# Patient Record
Sex: FEMALE | Race: WHITE | NOT HISPANIC OR LATINO | URBAN - METROPOLITAN AREA
[De-identification: names, ages, dates, MRNs, and addresses within clinical notes are randomized per-mention and may not be internally consistent; named-entity substitution may affect disease eponyms.]

---

## 2022-10-18 ENCOUNTER — OFFICE (OUTPATIENT)
Dept: URBAN - METROPOLITAN AREA CLINIC 34 | Facility: CLINIC | Age: 37
End: 2022-10-18
Payer: COMMERCIAL

## 2022-10-18 VITALS
HEART RATE: 86 BPM | WEIGHT: 200 LBS | DIASTOLIC BLOOD PRESSURE: 90 MMHG | TEMPERATURE: 97.7 F | SYSTOLIC BLOOD PRESSURE: 122 MMHG | HEIGHT: 65 IN

## 2022-10-18 DIAGNOSIS — K50.90 CROHN'S DISEASE, UNSPECIFIED, WITHOUT COMPLICATIONS: ICD-10-CM

## 2022-10-18 DIAGNOSIS — M25.50 PAIN IN UNSPECIFIED JOINT: ICD-10-CM

## 2022-10-18 PROCEDURE — 99204 OFFICE O/P NEW MOD 45 MIN: CPT | Performed by: INTERNAL MEDICINE

## 2022-10-18 NOTE — SERVICEHPINOTES
JUAN ABDUL   is a   37   year old    female who is being seen in consultation at the request of   AFRICA MCGEE   for establishing care for Crohn's disease. She moved from [place]. She likely had symptoms starting in elementary school but was officially diagnosed in 2011. She thinks she has had involvement of both the small intestine and large intestine. She was managed on mesalamines for 10 yrs but that never completely controlled symptoms. She would have flares that would require prednisone about once a year. She then had bowel obstruction a couple of years ago for which she was admitted. She did not require surgery at the time but was started on biologic therapy after that. She was initially started on remicade but had side effects (rash and difficulty breathing). She was then transitioned to stelara which she is currently taking every 8 weeks. Her last colonoscopy was about a year ago but the scope could not be advanced beyond a certain point.  She did have imaging done after that.  She has been doing much better on stelara. She has noted less fatigue, more formed BMs, and no bleeding. She can get occasional cramping and episodes of abdominal pain. She can get bloating but less often and less severe since being on stelara.  She denies any skin rashes or oral ulcers or eye problems. She has noted some left sided joint pain (hip/knee/foot) which has gotten somewhat better since switching mattresses. She thinks the joint aches may get worse after stelara (at least after last injection) but on further questioning she thinks joint aches precede the stelara.   br Of note, she was told by a prior GI that she might be developing a gluten sensitivity based on pathology specimens.

## 2023-05-03 ENCOUNTER — OFFICE (OUTPATIENT)
Dept: URBAN - METROPOLITAN AREA CLINIC 79 | Facility: CLINIC | Age: 38
End: 2023-05-03

## 2023-05-03 ENCOUNTER — OFFICE (OUTPATIENT)
Dept: URBAN - METROPOLITAN AREA CLINIC 79 | Facility: CLINIC | Age: 38
End: 2023-05-03
Payer: COMMERCIAL

## 2023-05-03 VITALS
TEMPERATURE: 97.7 F | SYSTOLIC BLOOD PRESSURE: 129 MMHG | HEIGHT: 65 IN | HEART RATE: 83 BPM | DIASTOLIC BLOOD PRESSURE: 93 MMHG | WEIGHT: 200 LBS

## 2023-05-03 DIAGNOSIS — K50.90 CROHN'S DISEASE, UNSPECIFIED, WITHOUT COMPLICATIONS: ICD-10-CM

## 2023-05-03 DIAGNOSIS — R14.0 ABDOMINAL DISTENSION (GASEOUS): ICD-10-CM

## 2023-05-03 DIAGNOSIS — K21.00 GASTRO-ESOPHAGEAL REFLUX DISEASE WITH ESOPHAGITIS, WITHOUT B: ICD-10-CM

## 2023-05-03 DIAGNOSIS — K90.41 NON-CELIAC GLUTEN SENSITIVITY: ICD-10-CM

## 2023-05-03 DIAGNOSIS — M25.50 PAIN IN UNSPECIFIED JOINT: ICD-10-CM

## 2023-05-03 DIAGNOSIS — R21 RASH AND OTHER NONSPECIFIC SKIN ERUPTION: ICD-10-CM

## 2023-05-03 DIAGNOSIS — R10.9 UNSPECIFIED ABDOMINAL PAIN: ICD-10-CM

## 2023-05-03 PROCEDURE — 99215 OFFICE O/P EST HI 40 MIN: CPT | Performed by: NURSE PRACTITIONER

## 2023-05-03 PROCEDURE — 00031: CPT | Performed by: INTERNAL MEDICINE

## 2023-05-03 RX ORDER — FAMOTIDINE 20 MG/1
TABLET ORAL
Qty: 30 | Refills: 5 | Status: ACTIVE
Start: 2023-05-03

## 2023-05-03 NOTE — SERVICEHPINOTES
Brief history - She likely had symptoms starting in elementary school but was officially diagnosed in 2011. She thinks she has had involvement of both the small intestine and large intestine. She was managed on mesalamines for 10 yrs but that never completely controlled symptoms. She would have flares that would require prednisone about once a year. She then had bowel obstruction a couple of years ago for which she was admitted. She did not require surgery at the time but was started on biologic therapy after that. She was initially started on remicade but had side effects (rash and difficulty breathing). She was then transitioned to stelara which she is currently taking every 8 weeks. Her last colonoscopy was in 2021 ago but the scope could not be advanced beyond a certain point. She did have imaging done after that. She has been doing much better on stelara.
br
Miriane her last visit 10/2022, she had been doing fairly well until January when she had a lapse in her health insurance and she was briefly off her Stelara. She restarted Stelara in February and has had two doses 6 weeks apart with her last dose on April 26. During that lapse she completed 1 prednisone taper.brCurrently, her bowel movements are 5 to 6 times daily, BSS type 6 to 7. She has associated bloating where she feels like she’s 8 to 9 months pregnant. She has been tested for celiac‘s disease in the past which was negative however, she does feel that when she stops eating gluten her bloating symptoms improve. She has also cut lactose out of her diet. She also has GERD.  It is not controlled with Omeprazole.  Denies dysphagia or odynophagia. Recently, she was diagnosed with strep throat at an [facility] and is currently completing amoxicillin. Her diarrhea initially worsened, but she began taking probiotics which has helped. brShe denies and blood or mucous in her stools. She has associated LLQ pain.  No nausea or vomiting. +joint pains.  New rash in right AC area. Bentyl prescribed by PCP has helped with LLQ pain. erica maldonado

## 2023-06-21 ENCOUNTER — OFFICE (OUTPATIENT)
Dept: URBAN - METROPOLITAN AREA PATHOLOGY 18 | Facility: PATHOLOGY | Age: 38
End: 2023-06-21
Payer: COMMERCIAL

## 2023-06-21 ENCOUNTER — OFFICE (OUTPATIENT)
Dept: URBAN - METROPOLITAN AREA CLINIC 30 | Facility: CLINIC | Age: 38
End: 2023-06-21
Payer: COMMERCIAL

## 2023-06-21 VITALS
SYSTOLIC BLOOD PRESSURE: 127 MMHG | RESPIRATION RATE: 16 BRPM | DIASTOLIC BLOOD PRESSURE: 152 MMHG | OXYGEN SATURATION: 97 % | HEART RATE: 100 BPM | HEART RATE: 81 BPM | TEMPERATURE: 97.7 F | OXYGEN SATURATION: 98 % | DIASTOLIC BLOOD PRESSURE: 87 MMHG | OXYGEN SATURATION: 89 % | RESPIRATION RATE: 14 BRPM | HEART RATE: 79 BPM | DIASTOLIC BLOOD PRESSURE: 96 MMHG | SYSTOLIC BLOOD PRESSURE: 128 MMHG | SYSTOLIC BLOOD PRESSURE: 146 MMHG | HEART RATE: 95 BPM | SYSTOLIC BLOOD PRESSURE: 184 MMHG | HEART RATE: 101 BPM | SYSTOLIC BLOOD PRESSURE: 120 MMHG | HEIGHT: 65 IN | WEIGHT: 200 LBS | SYSTOLIC BLOOD PRESSURE: 157 MMHG | DIASTOLIC BLOOD PRESSURE: 99 MMHG | RESPIRATION RATE: 12 BRPM | DIASTOLIC BLOOD PRESSURE: 67 MMHG | DIASTOLIC BLOOD PRESSURE: 84 MMHG | HEART RATE: 102 BPM | SYSTOLIC BLOOD PRESSURE: 138 MMHG | OXYGEN SATURATION: 99 % | DIASTOLIC BLOOD PRESSURE: 95 MMHG | HEART RATE: 88 BPM | DIASTOLIC BLOOD PRESSURE: 82 MMHG | SYSTOLIC BLOOD PRESSURE: 148 MMHG | HEART RATE: 94 BPM | RESPIRATION RATE: 17 BRPM

## 2023-06-21 DIAGNOSIS — K56.699 OTHER INTESTINAL OBSTRUCTION UNSPECIFIED AS TO PARTIAL VERSU: ICD-10-CM

## 2023-06-21 DIAGNOSIS — R19.7 DIARRHEA, UNSPECIFIED: ICD-10-CM

## 2023-06-21 DIAGNOSIS — K21.00 GASTRO-ESOPHAGEAL REFLUX DISEASE WITH ESOPHAGITIS, WITHOUT B: ICD-10-CM

## 2023-06-21 DIAGNOSIS — K64.0 FIRST DEGREE HEMORRHOIDS: ICD-10-CM

## 2023-06-21 DIAGNOSIS — K90.41 NON-CELIAC GLUTEN SENSITIVITY: ICD-10-CM

## 2023-06-21 DIAGNOSIS — K50.90 CROHN'S DISEASE, UNSPECIFIED, WITHOUT COMPLICATIONS: ICD-10-CM

## 2023-06-21 DIAGNOSIS — K63.3 ULCER OF INTESTINE: ICD-10-CM

## 2023-06-21 DIAGNOSIS — R14.0 ABDOMINAL DISTENSION (GASEOUS): ICD-10-CM

## 2023-06-21 DIAGNOSIS — K29.50 UNSPECIFIED CHRONIC GASTRITIS WITHOUT BLEEDING: ICD-10-CM

## 2023-06-21 DIAGNOSIS — K63.89 OTHER SPECIFIED DISEASES OF INTESTINE: ICD-10-CM

## 2023-06-21 PROBLEM — K31.89 OTHER DISEASES OF STOMACH AND DUODENUM: Status: ACTIVE | Noted: 2023-06-21

## 2023-06-21 PROCEDURE — 88305 TISSUE EXAM BY PATHOLOGIST: CPT | Performed by: PATHOLOGY

## 2023-06-21 PROCEDURE — 88313 SPECIAL STAINS GROUP 2: CPT | Performed by: PATHOLOGY

## 2023-06-21 PROCEDURE — 88342 IMHCHEM/IMCYTCHM 1ST ANTB: CPT | Performed by: PATHOLOGY
